# Patient Record
Sex: FEMALE | Race: BLACK OR AFRICAN AMERICAN | NOT HISPANIC OR LATINO | Employment: FULL TIME | URBAN - METROPOLITAN AREA
[De-identification: names, ages, dates, MRNs, and addresses within clinical notes are randomized per-mention and may not be internally consistent; named-entity substitution may affect disease eponyms.]

---

## 2022-05-17 ENCOUNTER — OFFICE VISIT (OUTPATIENT)
Dept: OTOLARYNGOLOGY | Facility: CLINIC | Age: 57
End: 2022-05-17
Payer: COMMERCIAL

## 2022-05-17 ENCOUNTER — ANESTHESIA EVENT (OUTPATIENT)
Dept: SURGERY | Facility: HOSPITAL | Age: 57
End: 2022-05-17
Payer: COMMERCIAL

## 2022-05-17 ENCOUNTER — HOSPITAL ENCOUNTER (OUTPATIENT)
Dept: CARDIOLOGY | Facility: HOSPITAL | Age: 57
Discharge: HOME | End: 2022-05-17
Payer: COMMERCIAL

## 2022-05-17 VITALS
SYSTOLIC BLOOD PRESSURE: 103 MMHG | HEART RATE: 79 BPM | TEMPERATURE: 98 F | WEIGHT: 233.94 LBS | DIASTOLIC BLOOD PRESSURE: 63 MMHG

## 2022-05-17 DIAGNOSIS — S00.83XA TRAUMATIC HEMATOMA OF FOREHEAD, INITIAL ENCOUNTER: ICD-10-CM

## 2022-05-17 DIAGNOSIS — Z01.818 OTHER SPECIFIED PRE-OPERATIVE EXAMINATION: Primary | ICD-10-CM

## 2022-05-17 DIAGNOSIS — S01.81XA FOREHEAD LACERATION, INITIAL ENCOUNTER: ICD-10-CM

## 2022-05-17 DIAGNOSIS — J34.89 NASAL OBSTRUCTION: ICD-10-CM

## 2022-05-17 DIAGNOSIS — S02.2XXA CLOSED DISPLACED FRACTURE OF NASAL BONE, INITIAL ENCOUNTER: ICD-10-CM

## 2022-05-17 DIAGNOSIS — S02.2XXA CLOSED DISPLACED FRACTURE OF NASAL BONE, INITIAL ENCOUNTER: Primary | ICD-10-CM

## 2022-05-17 PROCEDURE — 99214 OFFICE O/P EST MOD 30 MIN: CPT | Mod: PBBFAC

## 2022-05-17 PROCEDURE — 93005 ELECTROCARDIOGRAM TRACING: CPT

## 2022-05-17 RX ORDER — SODIUM CHLORIDE 0.9 % (FLUSH) 0.9 %
10 SYRINGE (ML) INJECTION
Status: DISCONTINUED | OUTPATIENT
Start: 2022-05-17 | End: 2022-05-18 | Stop reason: CLARIF

## 2022-05-17 RX ORDER — ACETAMINOPHEN 500 MG
TABLET ORAL
COMMUNITY
Start: 2022-02-15

## 2022-05-17 RX ORDER — HYDROCODONE BITARTRATE AND ACETAMINOPHEN 7.5; 325 MG/1; MG/1
1 TABLET ORAL EVERY 6 HOURS PRN
Status: ON HOLD | COMMUNITY
Start: 2022-05-12 | End: 2022-05-20 | Stop reason: HOSPADM

## 2022-05-17 RX ORDER — METFORMIN HYDROCHLORIDE 500 MG/1
500 TABLET, EXTENDED RELEASE ORAL 2 TIMES DAILY
COMMUNITY
Start: 2022-02-07

## 2022-05-17 RX ORDER — ATORVASTATIN CALCIUM 40 MG/1
TABLET, FILM COATED ORAL
COMMUNITY
Start: 2022-04-01

## 2022-05-17 RX ORDER — SPIRONOLACTONE 50 MG/1
50 TABLET, FILM COATED ORAL 2 TIMES DAILY
COMMUNITY
Start: 2022-02-08

## 2022-05-17 RX ORDER — LIDOCAINE HYDROCHLORIDE 10 MG/ML
1 INJECTION, SOLUTION EPIDURAL; INFILTRATION; INTRACAUDAL; PERINEURAL ONCE
Status: CANCELLED | OUTPATIENT
Start: 2022-05-17 | End: 2022-05-17

## 2022-05-17 RX ORDER — AMLODIPINE AND VALSARTAN 5; 320 MG/1; MG/1
1 TABLET ORAL DAILY
COMMUNITY
Start: 2022-02-24

## 2022-05-17 RX ORDER — HYDROCHLOROTHIAZIDE 25 MG/1
25 TABLET ORAL DAILY
COMMUNITY
Start: 2022-02-24

## 2022-05-17 NOTE — ANESTHESIA PREPROCEDURE EVALUATION
"                                                                                                             05/17/2022  Angeli Ash is a 56 y.o., female with PMHx of obesity, HTN, HLD, DM presents for CRNF.    COVID vaccine x 2 (last dose 3/18/22)    BETA-BLOCKER: NONE    [facility] NURSE PHONE INTERVIEW 5/18/22    PROBLEM LIST:  -  CLOSED NASAL FRACTURE, NASAL OBSTRUCTION (ALSO w/FOREHEAD      LAC/HEMATOMA) 2/2 FALL 5/12/22  -  OBESITY (NEED HT. For BMI)  -  HTN  -  HLD  -  MIGRAINES  -  T2DM (ORAL X1); NO HOME GLUCOSE TESTING  -  ANEMIA - 12/27/14 H&H 6/26    AM Rx DOS: EXFORGE, NORCO PRN    ORDERS -   SURGEON: 12/27/14 CBC, CMP; 5/17/22 EKG; NO NEW ORDER  ANESTHESIA: ADD: CBC, BMP, A1c; DM PROTOCOL    Pre-op Assessment    I have reviewed the NPO Status.      Review of Systems  Anesthesia Hx:  No problems with previous Anesthesia    Social:  Non-Smoker    Cardiovascular:   Hypertension, well controlled hyperlipidemia    Pulmonary:  Pulmonary Normal    Renal/:  Renal/ Normal     Hepatic/GI:  Hepatic/GI Normal    Neurological:  Neurology Normal    Endocrine:   Diabetes  Obesity / BMI > 30    Vitals:    05/18/22 1029 05/20/22 0718   BP:  127/82   Pulse:  86   Temp:  36.8 °C (98.3 °F)   TempSrc:  Oral   SpO2:  98%   Weight: 106.1 kg (234 lb)    Height: 5' 5" (1.651 m)        Physical Exam  General: Alert, Cooperative and Oriented    Airway:  Mallampati: II   Mouth Opening: Normal  TM Distance: Normal  Tongue: Normal  Neck ROM: Normal ROM    Dental:  Intact    Chest/Lungs:  Clear to auscultation, Normal Respiratory Rate    Heart:  Rate: Normal  Rhythm: Regular Rhythm  Sounds: Normal       Latest Reference Range & Units 05/20/22 09:50   POCT Glucose 70 - 110 mg/dL 98     Lab Results   Component Value Date    WBC 7.5 05/18/2022    HGB 12.0 05/18/2022    HCT 38.6 05/18/2022    MCV 82.3 05/18/2022     05/18/2022       CMP  Sodium Level   Date Value Ref Range Status   05/18/2022 138 136 - 145 mmol/L Final "     Potassium Level   Date Value Ref Range Status   05/18/2022 4.7 3.5 - 5.1 mmol/L Final     Carbon Dioxide   Date Value Ref Range Status   05/18/2022 23 22 - 29 mmol/L Final     Blood Urea Nitrogen   Date Value Ref Range Status   05/18/2022 20.5 (H) 9.8 - 20.1 mg/dL Final     Creatinine   Date Value Ref Range Status   05/18/2022 1.14 (H) 0.55 - 1.02 mg/dL Final     Calcium Level Total   Date Value Ref Range Status   05/18/2022 10.0 8.4 - 10.2 mg/dL Final       5/17/22 EKG          Anesthesia Plan  Type of Anesthesia, risks & benefits discussed:    Anesthesia Type: Gen Supraglottic Airway  Intra-op Monitoring Plan: Standard ASA Monitors  Post Op Pain Control Plan: IV/PO Opioids PRN  Induction:  IV  Informed Consent: Informed consent signed with the Patient and all parties understand the risks and agree with anesthesia plan.  All questions answered.   ASA Score: 2  Day of Surgery Review of History & Physical: H&P Update referred to the surgeon/provider.    Ready For Surgery From Anesthesia Perspective.     .

## 2022-05-17 NOTE — PROGRESS NOTES
[facility]  [address] [address]  Dept: [phone]    05/17/2022  .hh      <Will be replaced with encounter date>

## 2022-05-17 NOTE — PROGRESS NOTES
[facility]      REFERRING PHYSICIAN: ED  REASON FOR REFERRAL:  Facial trauma  CHIEF COMPLAINT:   Chief Complaint   Patient presents with    Had a fall and hit concrete,     Went to [facility], referred to [facility]      HISTORY OF PRESENT ILLNESS:   Angeli Ash is a 56 y.o. female with a past medical history significant for hypertension presenting for evaluation of facial trauma.  Patient had a fall while she was putting out her trash can on 05/12/2022.  She did not have any loss of consciousness, syncope or lightheadedness at this time.  Patient was then seen at [facility] at which time she was noted to have a forehead laceration and some nasal bone fractures.  Patient was referred here for further care.  At this time her laceration has been sutured however, she has not been applying any ointment to wait.  There is some crusting around it and usually she has some pain during cleaning her sutures and therefore only performing limited wound care.  She has been prescribed Norco however this makes her nauseous.  She does note she has some nasal obstruction for which she is using nasal saline after which she does have improvement in her symptoms.  Denies all other head and neck related issues    REVIEW OF SYSTEMS:   ROS   As above and otherwise negative X 10-point review.     PAST MEDICAL HISTORY:  Past Medical History:   Diagnosis Date    Facial trauma May 12, 2022    Hypertension 2015    Migraine headache 1976    Obesity 2016        PAST SURGICAL HISTORY:  History reviewed. No pertinent surgical history.      SOCIAL HISTORY:  Social History     Socioeconomic History    Marital status: Single   Tobacco Use    Smoking status: Never Smoker    Smokeless tobacco: Never Used    Tobacco comment: Family members on both sides smoked.   Substance and Sexual Activity    Alcohol use: Never    Drug use: Never    Sexual activity: Not Currently      Partners: Male     Comment: The Pill         ALLERGIES:  Review of patient's allergies indicates:  No Known Allergies     FAMILY HISTORY:  Family History   Problem Relation Age of Onset    Hypertension Mother     Stroke Maternal Grandmother     Cancer Maternal Aunt     Diabetes Brother     Heart failure Sister         CURRENT MEDICATIONS:  Current Outpatient Medications on File Prior to Visit   Medication Sig Dispense Refill    atorvastatin (LIPITOR) 40 MG tablet       BABY ASPIRIN ORAL       cholecalciferol, vitamin D3, (VITAMIN D3) 50 mcg (2,000 unit) Cap capsule       amlodipine-valsartan (EXFORGE) 5-320 mg per tablet Take 1 tablet by mouth once daily.      hydroCHLOROthiazide (HYDRODIURIL) 25 MG tablet Take 25 mg by mouth once daily.      HYDROcodone-acetaminophen (NORCO) 7.5-325 mg per tablet Take 1 tablet by mouth every 6 (six) hours as needed.      metFORMIN (GLUCOPHAGE-XR) 500 MG ER 24hr tablet Take 500 mg by mouth 2 (two) times daily.      spironolactone (ALDACTONE) 50 MG tablet Take 50 mg by mouth 2 (two) times daily.       No current facility-administered medications on file prior to visit.        PHYSICAL EXAMINATION:  Vitals:    05/17/22 0922   BP: 103/63   Pulse: 79   Temp: 97.8 °F (36.6 °C)        General/ Voice: NAD, AAO, no dyspnea/inc WOB, no stridor, tolerates secretions; communicates effectively via voicing which is normal   Neuro: CN II - XII exam: intact    Head/Face:  Large T-shaped laceration paramedian forehead that appears to be well reapproximated and sutured with Prolene.  There does appear to be an underlying hematoma on exam.  This is soft without any erythema or tenderness to palpation.  Mild crepitus  Eyes: EOMI, PERRLA   Ears: Hearing well at normal conversation volume  AD:  Auricle within normal limit, external auditory canal also within normal limit, TM appears normal without any middle ear effusion, nonobstructive cerumen seen  AS: Auricle within normal limit,  external auditory canal also within normal limit, TM appears normal without any middle ear effusion, nonobstructive cerumen seen  Nose: nares normal, left-sided septal deviation, right-sided nasal bone bony step-off with a visible depression  Oral Cavity: MMM, no lesions or ulcerations, no leukoplakia, no edema; BOT and FOM are soft/NT and without lesions/ ulcerations/ leukoplakia; dentitions is fair   Oropharynx: No uvular deviation or deviation of the oropharyngeal wall noted, no erythema/ petechia/ clots; No effacement of the palatopharyngeal and/or palatoglossal folds. No fluctuance; tonsils are symmetric and 1+ without erythema/ exudate   Neck: no asymmetry, masses, or scars, no adenopathy, trachea midline, palpable landmarks; thyroid is soft, mobile, normal in size without nodules or tenderness  Cardiovascular: RRR, no r/g/m, 2+ distal pulses,    Respiratory:  chest rise symmetric bilaterally, normal effort and expanion      LABORATORY RESULTS:    RADIOLOGY (Personally reviewed by me):   CT Scan:          ASSESSMENT/PLAN:    Angeli Ash is a 56 y.o. female with forehead laceration (sutured), forehead hematoma, and nasal bone fractures with displacement and a step-off on exam resulting in nasal obstruction.    --given obvious nasal deformity along with nasal obstruction, we recommended closed nasal bone reduction to patient today and she is agreeable.  She would like to proceed this Friday on 05/20/2022.  We discussed risks, benefits and indications of nasal bone reduction.  Will plan to remove his sutures same day  --dDiscussed wound care.  Discussed with patient including laceration with soap and warm water.  She is to apply ointment (aquaphor) twice a day and keep a small layer of ointment on at all times.  To remove crusting use dilute hydrogen  -peroxide on a Q-tip.  -- In regards to her pain medication we discussed taking Tylenol as needed since her pain is mild at this time.  Given she has left our  more Norco, will not be providing narcotic pain medications postoperatively  -- Alternate warm/ ice compress to forhead  -- EKG to be performed today      A total of 40 minutes were spent in this encounter.    RTC 1 week post-op  Wandy Vidales MD   [facility] Department of Otolaryngology  PGY 5

## 2022-05-17 NOTE — PROGRESS NOTES
I have reviewed the notes, assessments, and/or procedures performed this visit, and I concur with the documentation.    Damion Ferguson M.D.

## 2022-05-17 NOTE — H&P (VIEW-ONLY)
[facility]      REFERRING PHYSICIAN: ED  REASON FOR REFERRAL:  Facial trauma  CHIEF COMPLAINT:   Chief Complaint   Patient presents with    Had a fall and hit concrete,     Went to [facility], referred to [facility]NT      HISTORY OF PRESENT ILLNESS:   Angeli Ash is a 56 y.o. female with a past medical history significant for hypertension presenting for evaluation of facial trauma.  Patient had a fall while she was putting out her trash can on 05/12/2022.  She did not have any loss of consciousness, syncope or lightheadedness at this time.  Patient was then seen at [facility] at which time she was noted to have a forehead laceration and some nasal bone fractures.  Patient was referred here for further care.  At this time her laceration has been sutured however, she has not been applying any ointment to wait.  There is some crusting around it and usually she has some pain during cleaning her sutures and therefore only performing limited wound care.  She has been prescribed Norco however this makes her nauseous.  She does note she has some nasal obstruction for which she is using nasal saline after which she does have improvement in her symptoms.  Denies all other head and neck related issues    REVIEW OF SYSTEMS:   ROS   As above and otherwise negative X 10-point review.     PAST MEDICAL HISTORY:  Past Medical History:   Diagnosis Date    Facial trauma May 12, 2022    Hypertension 2015    Migraine headache 1976    Obesity 2016        PAST SURGICAL HISTORY:  History reviewed. No pertinent surgical history.      SOCIAL HISTORY:  Social History     Socioeconomic History    Marital status: Single   Tobacco Use    Smoking status: Never Smoker    Smokeless tobacco: Never Used    Tobacco comment: Family members on both sides smoked.   Substance and Sexual Activity    Alcohol use: Never    Drug use: Never    Sexual activity: Not Currently      Partners: Male     Comment: The Pill         ALLERGIES:  Review of patient's allergies indicates:  No Known Allergies     FAMILY HISTORY:  Family History   Problem Relation Age of Onset    Hypertension Mother     Stroke Maternal Grandmother     Cancer Maternal Aunt     Diabetes Brother     Heart failure Sister         CURRENT MEDICATIONS:  Current Outpatient Medications on File Prior to Visit   Medication Sig Dispense Refill    atorvastatin (LIPITOR) 40 MG tablet       BABY ASPIRIN ORAL       cholecalciferol, vitamin D3, (VITAMIN D3) 50 mcg (2,000 unit) Cap capsule       amlodipine-valsartan (EXFORGE) 5-320 mg per tablet Take 1 tablet by mouth once daily.      hydroCHLOROthiazide (HYDRODIURIL) 25 MG tablet Take 25 mg by mouth once daily.      HYDROcodone-acetaminophen (NORCO) 7.5-325 mg per tablet Take 1 tablet by mouth every 6 (six) hours as needed.      metFORMIN (GLUCOPHAGE-XR) 500 MG ER 24hr tablet Take 500 mg by mouth 2 (two) times daily.      spironolactone (ALDACTONE) 50 MG tablet Take 50 mg by mouth 2 (two) times daily.       No current facility-administered medications on file prior to visit.        PHYSICAL EXAMINATION:  Vitals:    05/17/22 0922   BP: 103/63   Pulse: 79   Temp: 97.8 °F (36.6 °C)        General/ Voice: NAD, AAO, no dyspnea/inc WOB, no stridor, tolerates secretions; communicates effectively via voicing which is normal   Neuro: CN II - XII exam: intact    Head/Face:  Large T-shaped laceration paramedian forehead that appears to be well reapproximated and sutured with Prolene.  There does appear to be an underlying hematoma on exam.  This is soft without any erythema or tenderness to palpation.  Mild crepitus  Eyes: EOMI, PERRLA   Ears: Hearing well at normal conversation volume  AD:  Auricle within normal limit, external auditory canal also within normal limit, TM appears normal without any middle ear effusion, nonobstructive cerumen seen  AS: Auricle within normal limit,  external auditory canal also within normal limit, TM appears normal without any middle ear effusion, nonobstructive cerumen seen  Nose: nares normal, left-sided septal deviation, right-sided nasal bone bony step-off with a visible depression  Oral Cavity: MMM, no lesions or ulcerations, no leukoplakia, no edema; BOT and FOM are soft/NT and without lesions/ ulcerations/ leukoplakia; dentitions is fair   Oropharynx: No uvular deviation or deviation of the oropharyngeal wall noted, no erythema/ petechia/ clots; No effacement of the palatopharyngeal and/or palatoglossal folds. No fluctuance; tonsils are symmetric and 1+ without erythema/ exudate   Neck: no asymmetry, masses, or scars, no adenopathy, trachea midline, palpable landmarks; thyroid is soft, mobile, normal in size without nodules or tenderness  Cardiovascular: RRR, no r/g/m, 2+ distal pulses,    Respiratory:  chest rise symmetric bilaterally, normal effort and expanion      LABORATORY RESULTS:    RADIOLOGY (Personally reviewed by me):   CT Scan:          ASSESSMENT/PLAN:    Angeli Ash is a 56 y.o. female with forehead laceration (sutured), forehead hematoma, and nasal bone fractures with displacement and a step-off on exam resulting in nasal obstruction.    --given obvious nasal deformity along with nasal obstruction, we recommended closed nasal bone reduction to patient today and she is agreeable.  She would like to proceed this Friday on 05/20/2022.  We discussed risks, benefits and indications of nasal bone reduction.  Will plan to remove his sutures same day  --dDiscussed wound care.  Discussed with patient including laceration with soap and warm water.  She is to apply ointment (aquaphor) twice a day and keep a small layer of ointment on at all times.  To remove crusting use dilute hydrogen  -peroxide on a Q-tip.  -- In regards to her pain medication we discussed taking Tylenol as needed since her pain is mild at this time.  Given she has left our  more Norco, will not be providing narcotic pain medications postoperatively  -- Alternate warm/ ice compress to forhead  -- EKG to be performed today      A total of 40 minutes were spent in this encounter.    RTC 1 week post-op  Wandy Vidales MD   [facility] Department of Otolaryngology  PGY 5

## 2022-05-18 LAB
ANION GAP SERPL CALC-SCNC: 12 MEQ/L
BASOPHILS # BLD AUTO: 0.03 X10(3)/MCL (ref 0–0.2)
BASOPHILS NFR BLD AUTO: 0.4 %
BUN SERPL-MCNC: 20.5 MG/DL (ref 9.8–20.1)
CALCIUM SERPL-MCNC: 10 MG/DL (ref 8.4–10.2)
CHLORIDE SERPL-SCNC: 103 MMOL/L (ref 98–107)
CO2 SERPL-SCNC: 23 MMOL/L (ref 22–29)
CREAT SERPL-MCNC: 1.14 MG/DL (ref 0.55–1.02)
CREAT/UREA NIT SERPL: 18
EOSINOPHIL # BLD AUTO: 0.12 X10(3)/MCL (ref 0–0.9)
EOSINOPHIL NFR BLD AUTO: 1.6 %
ERYTHROCYTE [DISTWIDTH] IN BLOOD BY AUTOMATED COUNT: 13.6 % (ref 11.5–17)
EST. AVERAGE GLUCOSE BLD GHB EST-MCNC: 111.2 MG/DL
GLUCOSE SERPL-MCNC: 76 MG/DL (ref 74–100)
HBA1C MFR BLD: 5.5 %
HCT VFR BLD AUTO: 38.6 % (ref 37–47)
HGB BLD-MCNC: 12 GM/DL (ref 12–16)
IMM GRANULOCYTES # BLD AUTO: 0.03 X10(3)/MCL (ref 0–0.02)
IMM GRANULOCYTES NFR BLD AUTO: 0.4 % (ref 0–0.43)
LYMPHOCYTES # BLD AUTO: 1.71 X10(3)/MCL (ref 0.6–4.6)
LYMPHOCYTES NFR BLD AUTO: 22.8 %
MCH RBC QN AUTO: 25.6 PG (ref 27–31)
MCHC RBC AUTO-ENTMCNC: 31.1 MG/DL (ref 33–36)
MCV RBC AUTO: 82.3 FL (ref 80–94)
MONOCYTES # BLD AUTO: 0.51 X10(3)/MCL (ref 0.1–1.3)
MONOCYTES NFR BLD AUTO: 6.8 %
NEUTROPHILS # BLD AUTO: 5.1 X10(3)/MCL (ref 2.1–9.2)
NEUTROPHILS NFR BLD AUTO: 68 %
NRBC BLD AUTO-RTO: 0 %
PLATELET # BLD AUTO: 261 X10(3)/MCL (ref 130–400)
PMV BLD AUTO: 12.1 FL (ref 9.4–12.4)
POTASSIUM SERPL-SCNC: 4.7 MMOL/L (ref 3.5–5.1)
RBC # BLD AUTO: 4.69 X10(6)/MCL (ref 4.2–5.4)
SODIUM SERPL-SCNC: 138 MMOL/L (ref 136–145)
WBC # SPEC AUTO: 7.5 X10(3)/MCL (ref 4.5–11.5)

## 2022-05-18 PROCEDURE — 85025 COMPLETE CBC W/AUTO DIFF WBC: CPT

## 2022-05-18 PROCEDURE — 36415 COLL VENOUS BLD VENIPUNCTURE: CPT

## 2022-05-18 PROCEDURE — 83036 HEMOGLOBIN GLYCOSYLATED A1C: CPT

## 2022-05-18 PROCEDURE — 80048 BASIC METABOLIC PNL TOTAL CA: CPT

## 2022-05-20 ENCOUNTER — HOSPITAL ENCOUNTER (OUTPATIENT)
Facility: HOSPITAL | Age: 57
Discharge: HOME | End: 2022-05-20
Attending: OTOLARYNGOLOGY | Admitting: OTOLARYNGOLOGY
Payer: COMMERCIAL

## 2022-05-20 ENCOUNTER — ANESTHESIA (OUTPATIENT)
Dept: SURGERY | Facility: HOSPITAL | Age: 57
End: 2022-05-20
Payer: COMMERCIAL

## 2022-05-20 DIAGNOSIS — Z01.818 OTHER SPECIFIED PRE-OPERATIVE EXAMINATION: ICD-10-CM

## 2022-05-20 DIAGNOSIS — S01.81XA FOREHEAD LACERATION, INITIAL ENCOUNTER: ICD-10-CM

## 2022-05-20 DIAGNOSIS — S02.2XXA CLOSED DISPLACED FRACTURE OF NASAL BONE, INITIAL ENCOUNTER: Primary | ICD-10-CM

## 2022-05-20 DIAGNOSIS — J34.89 NASAL OBSTRUCTION: ICD-10-CM

## 2022-05-20 DIAGNOSIS — S00.83XA TRAUMATIC HEMATOMA OF FOREHEAD, INITIAL ENCOUNTER: ICD-10-CM

## 2022-05-20 LAB
POCT GLUCOSE: 91 MG/DL (ref 70–110)
POCT GLUCOSE: 98 MG/DL (ref 70–110)

## 2022-05-20 PROCEDURE — 63600175 PHARM REV CODE 636 W HCPCS: Performed by: NURSE ANESTHETIST, CERTIFIED REGISTERED

## 2022-05-20 PROCEDURE — 37000008 HC ANESTHESIA 1ST 15 MINUTES: Performed by: OTOLARYNGOLOGY

## 2022-05-20 PROCEDURE — 63600175 PHARM REV CODE 636 W HCPCS

## 2022-05-20 PROCEDURE — 27201423 OPTIME MED/SURG SUP & DEVICES STERILE SUPPLY: Performed by: OTOLARYNGOLOGY

## 2022-05-20 PROCEDURE — 36000704 HC OR TIME LEV I 1ST 15 MIN: Performed by: OTOLARYNGOLOGY

## 2022-05-20 PROCEDURE — 25000003 PHARM REV CODE 250: Performed by: STUDENT IN AN ORGANIZED HEALTH CARE EDUCATION/TRAINING PROGRAM

## 2022-05-20 PROCEDURE — 71000033 HC RECOVERY, INTIAL HOUR: Performed by: OTOLARYNGOLOGY

## 2022-05-20 PROCEDURE — 37000009 HC ANESTHESIA EA ADD 15 MINS: Performed by: OTOLARYNGOLOGY

## 2022-05-20 PROCEDURE — 25000003 PHARM REV CODE 250

## 2022-05-20 PROCEDURE — 71000016 HC POSTOP RECOV ADDL HR: Performed by: OTOLARYNGOLOGY

## 2022-05-20 PROCEDURE — 36000705 HC OR TIME LEV I EA ADD 15 MIN: Performed by: OTOLARYNGOLOGY

## 2022-05-20 PROCEDURE — 71000015 HC POSTOP RECOV 1ST HR: Performed by: OTOLARYNGOLOGY

## 2022-05-20 PROCEDURE — 63600175 PHARM REV CODE 636 W HCPCS: Performed by: ANESTHESIOLOGY

## 2022-05-20 PROCEDURE — 25000003 PHARM REV CODE 250: Performed by: NURSE ANESTHETIST, CERTIFIED REGISTERED

## 2022-05-20 RX ORDER — LIDOCAINE HCL/PF 100 MG/5ML
SYRINGE (ML) INTRAVENOUS
Status: DISCONTINUED | OUTPATIENT
Start: 2022-05-20 | End: 2022-05-20

## 2022-05-20 RX ORDER — PROMETHAZINE HYDROCHLORIDE 25 MG/ML
INJECTION, SOLUTION INTRAMUSCULAR; INTRAVENOUS
Status: COMPLETED
Start: 2022-05-20 | End: 2022-05-20

## 2022-05-20 RX ORDER — OXYMETAZOLINE HCL 0.05 %
SPRAY, NON-AEROSOL (ML) NASAL
Status: DISCONTINUED
Start: 2022-05-20 | End: 2022-05-20 | Stop reason: HOSPADM

## 2022-05-20 RX ORDER — OXYMETAZOLINE HCL 0.05 %
SPRAY, NON-AEROSOL (ML) NASAL
Status: DISCONTINUED | OUTPATIENT
Start: 2022-05-20 | End: 2022-05-20 | Stop reason: HOSPADM

## 2022-05-20 RX ORDER — TRAMADOL HYDROCHLORIDE 50 MG/1
50 TABLET ORAL EVERY 8 HOURS PRN
Qty: 12 TABLET | Refills: 0 | Status: SHIPPED | OUTPATIENT
Start: 2022-05-20

## 2022-05-20 RX ORDER — PROPOFOL 10 MG/ML
INJECTION, EMULSION INTRAVENOUS
Status: DISCONTINUED | OUTPATIENT
Start: 2022-05-20 | End: 2022-05-20

## 2022-05-20 RX ORDER — BACITRACIN 500 [USP'U]/G
OINTMENT TOPICAL
Status: DISCONTINUED
Start: 2022-05-20 | End: 2022-05-20 | Stop reason: HOSPADM

## 2022-05-20 RX ORDER — HYDROMORPHONE HYDROCHLORIDE 1 MG/ML
INJECTION, SOLUTION INTRAMUSCULAR; INTRAVENOUS; SUBCUTANEOUS
Status: DISCONTINUED | OUTPATIENT
Start: 2022-05-20 | End: 2022-05-20

## 2022-05-20 RX ORDER — ONDANSETRON 2 MG/ML
INJECTION INTRAMUSCULAR; INTRAVENOUS
Status: DISCONTINUED | OUTPATIENT
Start: 2022-05-20 | End: 2022-05-20

## 2022-05-20 RX ORDER — CEFAZOLIN SODIUM 1 G/3ML
2 INJECTION, POWDER, FOR SOLUTION INTRAMUSCULAR; INTRAVENOUS ONCE
Status: DISCONTINUED | OUTPATIENT
Start: 2022-05-20 | End: 2022-05-20 | Stop reason: HOSPADM

## 2022-05-20 RX ORDER — BACITRACIN ZINC 500 UNIT/G
OINTMENT (GRAM) TOPICAL
Status: DISCONTINUED | OUTPATIENT
Start: 2022-05-20 | End: 2022-05-20 | Stop reason: HOSPADM

## 2022-05-20 RX ORDER — PROMETHAZINE HYDROCHLORIDE 25 MG/ML
12.5 INJECTION, SOLUTION INTRAMUSCULAR; INTRAVENOUS ONCE
Status: CANCELLED | OUTPATIENT
Start: 2022-05-20

## 2022-05-20 RX ORDER — MORPHINE SULFATE 10 MG/ML
2 INJECTION INTRAMUSCULAR; INTRAVENOUS; SUBCUTANEOUS EVERY 5 MIN PRN
Status: COMPLETED | OUTPATIENT
Start: 2022-05-20 | End: 2022-05-20

## 2022-05-20 RX ORDER — LIDOCAINE HYDROCHLORIDE 10 MG/ML
1 INJECTION, SOLUTION EPIDURAL; INFILTRATION; INTRACAUDAL; PERINEURAL ONCE
Status: ACTIVE | OUTPATIENT
Start: 2022-05-20

## 2022-05-20 RX ORDER — OXYCODONE HYDROCHLORIDE 5 MG/1
5 TABLET ORAL
Status: DISCONTINUED | OUTPATIENT
Start: 2022-05-20 | End: 2022-05-20 | Stop reason: HOSPADM

## 2022-05-20 RX ORDER — ONDANSETRON 2 MG/ML
4 INJECTION INTRAMUSCULAR; INTRAVENOUS DAILY PRN
Status: DISCONTINUED | OUTPATIENT
Start: 2022-05-20 | End: 2022-05-20 | Stop reason: HOSPADM

## 2022-05-20 RX ORDER — CEFAZOLIN SODIUM 1 G/3ML
INJECTION, POWDER, FOR SOLUTION INTRAMUSCULAR; INTRAVENOUS
Status: DISCONTINUED | OUTPATIENT
Start: 2022-05-20 | End: 2022-05-20

## 2022-05-20 RX ORDER — FENTANYL CITRATE 50 UG/ML
INJECTION, SOLUTION INTRAMUSCULAR; INTRAVENOUS
Status: DISCONTINUED | OUTPATIENT
Start: 2022-05-20 | End: 2022-05-20

## 2022-05-20 RX ORDER — LIDOCAINE HYDROCHLORIDE 10 MG/ML
1 INJECTION, SOLUTION EPIDURAL; INFILTRATION; INTRACAUDAL; PERINEURAL ONCE
Status: DISCONTINUED | OUTPATIENT
Start: 2022-05-20 | End: 2022-05-20 | Stop reason: HOSPADM

## 2022-05-20 RX ORDER — SODIUM CHLORIDE, SODIUM LACTATE, POTASSIUM CHLORIDE, CALCIUM CHLORIDE 600; 310; 30; 20 MG/100ML; MG/100ML; MG/100ML; MG/100ML
INJECTION, SOLUTION INTRAVENOUS CONTINUOUS
Status: DISCONTINUED | OUTPATIENT
Start: 2022-05-20 | End: 2022-05-20 | Stop reason: HOSPADM

## 2022-05-20 RX ORDER — MEPERIDINE HYDROCHLORIDE 25 MG/ML
12.5 INJECTION INTRAMUSCULAR; INTRAVENOUS; SUBCUTANEOUS EVERY 10 MIN PRN
Status: DISCONTINUED | OUTPATIENT
Start: 2022-05-20 | End: 2022-05-20 | Stop reason: HOSPADM

## 2022-05-20 RX ORDER — MIDAZOLAM HYDROCHLORIDE 1 MG/ML
INJECTION INTRAMUSCULAR; INTRAVENOUS
Status: DISCONTINUED
Start: 2022-05-20 | End: 2022-05-20 | Stop reason: HOSPADM

## 2022-05-20 RX ORDER — LIDOCAINE HCL/EPINEPHRINE/PF 2%-1:200K
VIAL (ML) INJECTION
Status: DISCONTINUED
Start: 2022-05-20 | End: 2022-05-20 | Stop reason: WASHOUT

## 2022-05-20 RX ORDER — INSULIN ASPART 100 [IU]/ML
4-12 INJECTION, SOLUTION INTRAVENOUS; SUBCUTANEOUS
Status: ACTIVE | OUTPATIENT
Start: 2022-05-20

## 2022-05-20 RX ORDER — MIDAZOLAM HYDROCHLORIDE 1 MG/ML
2 INJECTION INTRAMUSCULAR; INTRAVENOUS ONCE AS NEEDED
Status: COMPLETED | OUTPATIENT
Start: 2022-05-20 | End: 2022-05-20

## 2022-05-20 RX ORDER — INSULIN ASPART 100 [IU]/ML
2-9 INJECTION, SOLUTION INTRAVENOUS; SUBCUTANEOUS
Status: ACTIVE | OUTPATIENT
Start: 2022-05-20

## 2022-05-20 RX ORDER — ACETAMINOPHEN 500 MG
1000 TABLET ORAL EVERY 8 HOURS PRN
Qty: 20 TABLET | Refills: 0 | Status: SHIPPED | OUTPATIENT
Start: 2022-05-20 | End: 2022-05-27

## 2022-05-20 RX ADMIN — ONDANSETRON 4 MG: 2 INJECTION INTRAMUSCULAR; INTRAVENOUS at 02:05

## 2022-05-20 RX ADMIN — MORPHINE SULFATE 2 MG: 10 INJECTION, SOLUTION INTRAMUSCULAR; INTRAVENOUS at 01:05

## 2022-05-20 RX ADMIN — MIDAZOLAM 2 MG: 1 INJECTION INTRAMUSCULAR; INTRAVENOUS at 11:05

## 2022-05-20 RX ADMIN — MORPHINE SULFATE 2 MG: 10 INJECTION, SOLUTION INTRAMUSCULAR; INTRAVENOUS at 02:05

## 2022-05-20 RX ADMIN — CEFAZOLIN 2 G: 330 INJECTION, POWDER, FOR SOLUTION INTRAMUSCULAR; INTRAVENOUS at 12:05

## 2022-05-20 RX ADMIN — SODIUM CHLORIDE, POTASSIUM CHLORIDE, SODIUM LACTATE AND CALCIUM CHLORIDE: 600; 310; 30; 20 INJECTION, SOLUTION INTRAVENOUS at 11:05

## 2022-05-20 RX ADMIN — LIDOCAINE HYDROCHLORIDE 80 MG: 20 INJECTION, SOLUTION INTRAVENOUS at 12:05

## 2022-05-20 RX ADMIN — FENTANYL CITRATE 50 MCG: 50 INJECTION INTRAMUSCULAR; INTRAVENOUS at 12:05

## 2022-05-20 RX ADMIN — HYDROMORPHONE HYDROCHLORIDE 0.5 MG: 1 INJECTION, SOLUTION INTRAMUSCULAR; INTRAVENOUS; SUBCUTANEOUS at 01:05

## 2022-05-20 RX ADMIN — PROMETHAZINE HYDROCHLORIDE 12.5 MG: 25 INJECTION INTRAMUSCULAR; INTRAVENOUS at 02:05

## 2022-05-20 RX ADMIN — PROPOFOL 150 MG: 10 INJECTION, EMULSION INTRAVENOUS at 12:05

## 2022-05-20 RX ADMIN — ONDANSETRON 4 MG: 2 INJECTION INTRAMUSCULAR; INTRAVENOUS at 12:05

## 2022-05-20 NOTE — ANESTHESIA PROCEDURE NOTES
Intubation    Date/Time: 5/20/2022 12:06 PM  Performed by: Yen Pérez CRNA  Authorized by: Sissy Simmons MD     Intubation:     Induction:  Intravenous    Intubated:  Postinduction    Mask Ventilation:  Easy mask    Attempts:  1    Attempted By:  CRNA    Difficult Airway Encountered?: No      Complications:  None    Airway Device:  Supraglottic airway/LMA    Airway Device Size:  4.0    Style/Cuff Inflation:  Uncuffed    Placement Verified By:  Capnometry    Complicating Factors:  None    Findings Post-Intubation:  BS equal bilateral and atraumatic/condition of teeth unchanged

## 2022-05-20 NOTE — ANESTHESIA POSTPROCEDURE EVALUATION
Anesthesia Post Evaluation    Patient: Angeli Ash    Procedure(s) Performed: Procedure(s) (LRB):  CLOSED REDUCTION, FRACTURE, NASAL BONE (N/A)  REMOVAL, SUTURE (N/A)    Final Anesthesia Type: general      Patient location during evaluation: PACU  Post-procedure vital signs: reviewed and stable  Airway patency: patent      Anesthetic complications: no      Cardiovascular status: hemodynamically stable  Respiratory status: spontaneous ventilation  Follow-up not needed.          Vitals Value Taken Time   /88 05/20/22 1404   Temp 36.4 °C (97.5 °F) 05/20/22 1349   Pulse 78 05/20/22 1404   Resp 18 05/20/22 1410   SpO2 96 % 05/20/22 1404         No case tracking events are documented in the log.      Pain/Lona Score: Pain Rating Prior to Med Admin: 6 (5/20/2022  2:10 PM)  Lona Score: 9 (5/20/2022  1:38 PM)

## 2022-05-20 NOTE — OP NOTE
[facility]  Otolaryngology  Operative Note    SUMMARY     Date of Procedure: 5/20/2022     Procedure: Procedure(s)   CLOSED REDUCTION, FRACTURE, NASAL BONE  CLOSED REDUCTION SEPTAL FRACTURE  SUTURE REMOVAL, WASHOUT AND REPAIR OF FOREHEAD LACERATION    Surgeon(s) and Role:     * Trev Rosa MD - Primary     * Wandy Vidales MD - Resident - Assisting     * Sarah Narayanan MD - Resident - Assisting      Pre-Operative Diagnosis: Closed displaced fracture of nasal bone, initial encounter [S02.2XXA]  Nasal obstruction [J34.89]  Forehead laceration, initial encounter [S01.81XA]  Traumatic hematoma of forehead, initial encounter [S00.83XA]    Post-Operative Diagnosis: Post-Op Diagnosis Codes:     * Closed displaced fracture of nasal bone, initial encounter [S02.2XXA]     * Nasal obstruction [J34.89]     * Forehead laceration, initial encounter [S01.81XA]     * Traumatic hematoma of forehead, initial encounter [S00.83XA]     * Hematoma of the forehead    Anesthesia: General    Estimated Blood Loss (EBL): 15 ml           Implants: External Denver split on nasal bones    Operative Findings (including complications, if any): Forehead hematoma, dehiscence of previous repair of laceration measuring 8 x 10 cm requiring washout and closure. Depressed R nasal bone fracture, left nasal bone fracture    Indication:  Angeli Ash is a 56 y.o. F who presented to the ENT clinic s/p fall with bilateral nasal bone fractures and septal fracture. She also had a forehead laceration that was repaired by the ED providers. Patient was seen and examined in the ENT clinic and her CT scan demonstrated bilateral nasal bone fractures with septal fracture. She reported associated nasal obstruction and had nasal deformity so patient was offered a closed reduction of her nasal bone fractures and septal fracture. She was also offered suture removal as her sutures had been in place for 8 days. After risks, benefits and  alternatives were discussed patient was scheduled for surgery.     Procedure in detail:  The patient was brought to the operating theater and placed supine on the operating table.  General endotracheal anesthesia was induced.  The forehead was prepped and draped in the usual sterile fashion.  Timeout was performed.  Perioperative antibiotics were administered. Forehead laceration sutures were removed. Palpable fluid collection was seen under the laceration, and hematoma was expressed. The wound then dehisced and pericranium was exposed. The wound was copiously irrigated with peroxide and betadine solution, followed by normal saline. Hemostasis was ensured using a combination of bipolar and monopolar cautery. The laceration was closed in layers using 3-0 vicryl deep for the subcutaneous layer encompassing muscle in simple interrupted fashion. The skin was then closed using 4-0 prolene in running locking fashion.     Attention was then turned to the nose. Afrin soaked pledgets were placed into the bilateral nasal cavities. De Jesus elevator was used to reduce the depressed R nasal bone first, followed by the L nasal bone. Asch forceps were used to straighten the deviated nasal septum. Nasopore was placed into the bilateral nasal cavities to support the nasal bones. Mastisol, steri strips, and Denver splint were then placed on the nasal dorsum.     Bacitracin was placed along the forehead incision, and pressure dressing was applied. The patient was then undraped, cleaned off, and turned over to anesthesia for emergence. All counts were correct at the end of the procedure.    Dr. Rosa was present and scrubbed for the entire procedure.      Specimens:   Specimen (24h ago, onward)            None          Condition: Good    Disposition: PACU - hemodynamically stable.

## 2022-05-20 NOTE — DISCHARGE INSTRUCTIONS
NO lifting of anything heavier than 10 pounds for two weeks.   Try your hardest not to blow your nose    if you have to sneeze, try to sneeze with your mouth open   KEEP PRESSURE DRESSING ON FOREHEAD FOR 3 DAYS, then remove and apply triple antibiotic ointment twice a day     if need to be cleaned, you may use a q-tip and peroxide to do so   There is a metal splint on your nose  - keep it on until follow up appointment and do not get it wet   STOP YOUR ASPIRIN UNTIL YOUR FOLLOW UP APPOINTMENT.  The dissolvable packing placed in your nose will dissolve on its own

## 2022-05-20 NOTE — TRANSFER OF CARE
Anesthesia Transfer of Care Note    Patient: Angeli Ash    Procedure(s) Performed: Procedure(s) (LRB):  CLOSED REDUCTION, FRACTURE, NASAL BONE (N/A)  REMOVAL, SUTURE (N/A)    Patient location: PACU    Anesthesia Type: general    Transport from OR: Transported from OR on room air with adequate spontaneous ventilation    Post pain: adequate analgesia    Post assessment: no apparent anesthetic complications    Post vital signs: stable    Level of consciousness: sedated    Nausea/Vomiting: no nausea/vomiting    Complications: none    Transfer of care protocol was followed      Last vitals:   See pacu documentation

## 2022-05-23 VITALS
RESPIRATION RATE: 20 BRPM | DIASTOLIC BLOOD PRESSURE: 79 MMHG | HEIGHT: 65 IN | OXYGEN SATURATION: 100 % | TEMPERATURE: 98 F | SYSTOLIC BLOOD PRESSURE: 138 MMHG | HEART RATE: 76 BPM | WEIGHT: 234 LBS | BODY MASS INDEX: 38.99 KG/M2

## 2022-05-26 ENCOUNTER — OFFICE VISIT (OUTPATIENT)
Dept: OTOLARYNGOLOGY | Facility: CLINIC | Age: 57
End: 2022-05-26
Payer: COMMERCIAL

## 2022-05-26 VITALS
BODY MASS INDEX: 39.26 KG/M2 | SYSTOLIC BLOOD PRESSURE: 109 MMHG | HEART RATE: 76 BPM | DIASTOLIC BLOOD PRESSURE: 72 MMHG | WEIGHT: 235.69 LBS | TEMPERATURE: 99 F

## 2022-05-26 DIAGNOSIS — J34.89 NASAL OBSTRUCTION: Primary | ICD-10-CM

## 2022-05-26 DIAGNOSIS — Z87.81 HISTORY OF CLOSED FRACTURE OF NASAL BONES: ICD-10-CM

## 2022-05-26 PROCEDURE — 99213 OFFICE O/P EST LOW 20 MIN: CPT | Mod: PBBFAC

## 2022-05-26 RX ORDER — FLUTICASONE PROPIONATE 50 MCG
1 SPRAY, SUSPENSION (ML) NASAL 2 TIMES DAILY
Qty: 15.8 ML | Refills: 0 | Status: SHIPPED | OUTPATIENT
Start: 2022-05-26 | End: 2022-05-27 | Stop reason: SDUPTHER

## 2022-05-26 NOTE — PROGRESS NOTES
[facility]   [facility] OTOLARYNGOLOGY H&P NOTE      REFERRING PHYSICIAN: ED  REASON FOR REFERRAL:  Facial trauma  CHIEF COMPLAINT:   Chief Complaint   Patient presents with    Wound Check     Post op      HISTORY OF PRESENT ILLNESS:   Angeli Ash is a 56 y.o. female with a past medical history significant for hypertension presenting for evaluation of facial trauma.  Patient had a fall while she was putting out her trash can on 05/12/2022.  She did not have any loss of consciousness, syncope or lightheadedness at this time.  Patient was then seen at [facility] at which time she was noted to have a forehead laceration and some nasal bone fractures.  Patient was referred here for further care.  At this time her laceration has been sutured however, she has not been applying any ointment to wait.  There is some crusting around it and usually she has some pain during cleaning her sutures and therefore only performing limited wound care.  She has been prescribed Norco however this makes her nauseous.  She does note she has some nasal obstruction for which she is using nasal saline after which she does have improvement in her symptoms.  Denies all other head and neck related issues    5/26/22: Returns today for post-op follow-up s/p closed reduction of nasal bone fracture and washout/repair of forehead laceration. Doing okay overall, but has not started using nasal rinses or sprays at all. She has been cleaning her forehead laceration as instructed. Still unable to breath through her nose.     REVIEW OF SYSTEMS:   ROS   As above and otherwise negative X 10-point review.     PAST MEDICAL HISTORY:  Past Medical History:   Diagnosis Date    Facial trauma May 12, 2022    Hypertension 2015    Migraine headache 1976    Obesity 2016        PAST SURGICAL HISTORY:  Past Surgical History:   Procedure Laterality Date    CLOSED REDUCTION OF FRACTURE OF NASAL BONE N/A 5/20/2022    Procedure: CLOSED  REDUCTION, FRACTURE, NASAL BONE;  Surgeon: Trev Rosa MD;  Location: [facility] OR;  Service: ENT;  Laterality: N/A;    SUTURE REMOVAL N/A 5/20/2022    Procedure: REMOVAL, SUTURE;  Surgeon: Trev Rosa MD;  Location: [facility] OR;  Service: ENT;  Laterality: N/A;         SOCIAL HISTORY:  Social History     Socioeconomic History    Marital status: Single   Tobacco Use    Smoking status: Never Smoker    Smokeless tobacco: Never Used    Tobacco comment: Family members on both sides smoked.   Substance and Sexual Activity    Alcohol use: Never    Drug use: Never    Sexual activity: Not Currently     Partners: Male     Comment: The Pill         ALLERGIES:  Review of patient's allergies indicates:  No Known Allergies     FAMILY HISTORY:  Family History   Problem Relation Age of Onset    Hypertension Mother     Stroke Maternal Grandmother     Cancer Maternal Aunt     Diabetes Brother     Heart failure Sister         CURRENT MEDICATIONS:  Current Outpatient Medications on File Prior to Visit   Medication Sig Dispense Refill    acetaminophen (TYLENOL) 500 MG tablet Take 2 tablets (1,000 mg total) by mouth every 8 (eight) hours as needed for Pain. 20 tablet 0    amlodipine-valsartan (EXFORGE) 5-320 mg per tablet Take 1 tablet by mouth once daily.      atorvastatin (LIPITOR) 40 MG tablet       cholecalciferol, vitamin D3, (VITAMIN D3) 50 mcg (2,000 unit) Cap capsule       hydroCHLOROthiazide (HYDRODIURIL) 25 MG tablet Take 25 mg by mouth once daily.      metFORMIN (GLUCOPHAGE-XR) 500 MG ER 24hr tablet Take 500 mg by mouth 2 (two) times daily.      sodium chloride (OCEAN NASAL) 0.65 % nasal spray 1 spray by Nasal route 3 (three) times daily. 30 mL 1    spironolactone (ALDACTONE) 50 MG tablet Take 50 mg by mouth 2 (two) times daily.      traMADoL (ULTRAM) 50 mg tablet Take 1 tablet (50 mg total) by mouth every 8 (eight) hours as needed for Pain. 12 tablet 0     Current Facility-Administered  Medications on File Prior to Visit   Medication Dose Route Frequency Provider Last Rate Last Admin    dextrose 10% bolus 125 mL  12.5 g Intravenous PRN Rachele Perez, FNP        dextrose 10% bolus 125 mL  12.5 g Intravenous PRN Rachele Perez, FNP        insulin aspart U-100 injection 2-9 Units  2-9 Units Subcutaneous PRN Rachele Perez, FNP        insulin aspart U-100 injection 4-12 Units  4-12 Units Subcutaneous PRN Rachele Perez, FNP        LIDOcaine (PF) 10 mg/ml (1%) injection 10 mg  1 mL Intradermal Once Rachele Perez, FNP            PHYSICAL EXAMINATION:  Vitals:    05/26/22 1002   BP: 109/72   Pulse: 76   Temp: 98.5 °F (36.9 °C)        General/ Voice: NAD, AAO  Neuro: CN II - XII exam: grossly intact    Head/Face:  Large T-shaped laceration paramedian forehead well reapproximated and sutured with Prolene.  Sutures removed, a few steristrips placed   Eyes: EOMI, PERRLA   Ears: Hearing well at normal conversation volume  Nose: [place] splint in place and removed, no obvious depression of the nasal bones, intranasal exam with edema, small area of edema/possible fluid collection to the anterior septum on the right, 1 ml of lidocaine with 1:100,000 epi injected and area aspirated with 18 gauge needle, no fluid aspirated  Oral Cavity: MMM, no lesions  Neck: no asymmetry, masses, or scars, no adenopathy      LABORATORY RESULTS:    RADIOLOGY Previous   CT Scan:          ASSESSMENT/PLAN:    Angeli Ash is a 56 y.o. female with forehead laceration (sutured), forehead hematoma, and nasal bone fractures with displacement and a step-off on exam resulting in nasal obstruction. Now s/p closed reduction of nasal bones and septum with laceration washout/repair.     -- Sutures removed today, a few steristrips placed- leave in place and keep dry  - Start ocean spray TID, use flonase twice daily  - RTC 1 week      Sarah Narayanan  [facility] Department of Otolaryngology  PGY IV

## 2022-05-27 DIAGNOSIS — Z87.81 HISTORY OF CLOSED FRACTURE OF NASAL BONES: ICD-10-CM

## 2022-05-27 DIAGNOSIS — J34.89 NASAL OBSTRUCTION: ICD-10-CM

## 2022-05-27 RX ORDER — FLUTICASONE PROPIONATE 50 MCG
1 SPRAY, SUSPENSION (ML) NASAL 2 TIMES DAILY
Qty: 15.8 ML | Refills: 0 | Status: SHIPPED | OUTPATIENT
Start: 2022-05-27

## 2022-06-02 ENCOUNTER — OFFICE VISIT (OUTPATIENT)
Dept: OTOLARYNGOLOGY | Facility: CLINIC | Age: 57
End: 2022-06-02
Payer: COMMERCIAL

## 2022-06-02 VITALS
SYSTOLIC BLOOD PRESSURE: 113 MMHG | TEMPERATURE: 98 F | BODY MASS INDEX: 39.65 KG/M2 | DIASTOLIC BLOOD PRESSURE: 73 MMHG | WEIGHT: 238 LBS | HEART RATE: 84 BPM

## 2022-06-02 DIAGNOSIS — S01.81XD FOREHEAD LACERATION, SUBSEQUENT ENCOUNTER: ICD-10-CM

## 2022-06-02 DIAGNOSIS — Z87.81 HISTORY OF CLOSED FRACTURE OF NASAL BONES: Primary | ICD-10-CM

## 2022-06-02 PROCEDURE — 99212 OFFICE O/P EST SF 10 MIN: CPT | Mod: PBBFAC

## 2022-06-02 NOTE — PROGRESS NOTES
[facility]   [facility] OTOLARYNGOLOGY H&P NOTE      REFERRING PHYSICIAN: ED  REASON FOR REFERRAL:  Facial trauma  CHIEF COMPLAINT:   No chief complaint on file.     HISTORY OF PRESENT ILLNESS:   Angeli Ash is a 56 y.o. female with a past medical history significant for hypertension presenting for evaluation of facial trauma.  Patient had a fall while she was putting out her trash can on 05/12/2022.  She did not have any loss of consciousness, syncope or lightheadedness at this time.  Patient was then seen at [facility] at which time she was noted to have a forehead laceration and some nasal bone fractures.  Patient was referred here for further care.  At this time her laceration has been sutured however, she has not been applying any ointment to wait.  There is some crusting around it and usually she has some pain during cleaning her sutures and therefore only performing limited wound care.  She has been prescribed Norco however this makes her nauseous.  She does note she has some nasal obstruction for which she is using nasal saline after which she does have improvement in her symptoms.  Denies all other head and neck related issues    5/26/22: Returns today for post-op follow-up s/p closed reduction of nasal bone fracture and washout/repair of forehead laceration. Doing okay overall, but has not started using nasal rinses or sprays at all. She has been cleaning her forehead laceration as instructed. Still unable to breath through her nose.     6/2/22: Returns for wound check. Using nasal regimen as directed, and is now able to breath well. Doing wound care around steristrips.    REVIEW OF SYSTEMS:   ROS   As above and otherwise negative X 10-point review.     PAST MEDICAL HISTORY:  Past Medical History:   Diagnosis Date    Facial trauma May 12, 2022    Hypertension 2015    Migraine headache 1976    Obesity 2016        PAST SURGICAL HISTORY:  Past Surgical History:   Procedure  Laterality Date    CLOSED REDUCTION OF FRACTURE OF NASAL BONE N/A 5/20/2022    Procedure: CLOSED REDUCTION, FRACTURE, NASAL BONE;  Surgeon: Trev Rosa MD;  Location: [facility] OR;  Service: ENT;  Laterality: N/A;    SUTURE REMOVAL N/A 5/20/2022    Procedure: REMOVAL, SUTURE;  Surgeon: Trev Rosa MD;  Location: [facility] OR;  Service: ENT;  Laterality: N/A;         SOCIAL HISTORY:  Social History     Socioeconomic History    Marital status: Single   Tobacco Use    Smoking status: Never Smoker    Smokeless tobacco: Never Used    Tobacco comment: Family members on both sides smoked.   Substance and Sexual Activity    Alcohol use: Never    Drug use: Never    Sexual activity: Not Currently     Partners: Male     Comment: The Pill         ALLERGIES:  Review of patient's allergies indicates:  No Known Allergies     FAMILY HISTORY:  Family History   Problem Relation Age of Onset    Hypertension Mother     Stroke Maternal Grandmother     Cancer Maternal Aunt     Diabetes Brother     Heart failure Sister         CURRENT MEDICATIONS:  Current Outpatient Medications on File Prior to Visit   Medication Sig Dispense Refill    amlodipine-valsartan (EXFORGE) 5-320 mg per tablet Take 1 tablet by mouth once daily.      atorvastatin (LIPITOR) 40 MG tablet       cholecalciferol, vitamin D3, (VITAMIN D3) 50 mcg (2,000 unit) Cap capsule       fluticasone propionate (FLONASE) 50 mcg/actuation nasal spray 1 spray (50 mcg total) by Each Nostril route 2 (two) times a day. 15.8 mL 0    hydroCHLOROthiazide (HYDRODIURIL) 25 MG tablet Take 25 mg by mouth once daily.      metFORMIN (GLUCOPHAGE-XR) 500 MG ER 24hr tablet Take 500 mg by mouth 2 (two) times daily.      sodium chloride (OCEAN NASAL) 0.65 % nasal spray 1 spray by Nasal route 3 (three) times daily. 30 mL 1    spironolactone (ALDACTONE) 50 MG tablet Take 50 mg by mouth 2 (two) times daily.      traMADoL (ULTRAM) 50 mg tablet Take 1 tablet (50 mg total) by  mouth every 8 (eight) hours as needed for Pain. 12 tablet 0     Current Facility-Administered Medications on File Prior to Visit   Medication Dose Route Frequency Provider Last Rate Last Admin    dextrose 10% bolus 125 mL  12.5 g Intravenous PRN Rachele Perez, FNP        dextrose 10% bolus 125 mL  12.5 g Intravenous PRN Rachele Perez, FNP        insulin aspart U-100 injection 2-9 Units  2-9 Units Subcutaneous PRN Rachele Perez, FNP        insulin aspart U-100 injection 4-12 Units  4-12 Units Subcutaneous PRN Rachele Perez, FNP        LIDOcaine (PF) 10 mg/ml (1%) injection 10 mg  1 mL Intradermal Once Rachele Perez, FNP            PHYSICAL EXAMINATION:  Vitals:    06/02/22 1317   BP: 113/73   Pulse: 84   Temp: 98.3 °F (36.8 °C)        General/ Voice: NAD, AAO  Neuro: CN II - XII exam: grossly intact    Head/Face:  Large T-shaped laceration paramedian forehead well reapproximated with a few steri strips in place,  steristrips removed, wound edges cleaned minor crusting  Eyes: EOMI, PERRLA   Ears: Hearing well at normal conversation volume  Nose: No obvious depression of the nasal bones, intranasal exam improved inferior turbinate hypertrophy, no septal hematoma or fluid collection  Oral Cavity: MMM, no lesions  Neck: no asymmetry, masses, or scars, no adenopathy      RADIOLOGY Previous   CT Scan:          ASSESSMENT/PLAN:    Angeli Ash is a 56 y.o. female with forehead laceration (sutured), forehead hematoma, and nasal bone fractures with displacement and a step-off on exam resulting in nasal obstruction. Now s/p closed reduction of nasal bones and septum with laceration washout/repair.       - Continue ocean spray TID, use flonase twice daily  - Wound care instructions given  - RTC 4 -5  Weeks for final check      Sarah Narayanan  [facility] Department of Otolaryngology  PGY IV

## 2022-06-16 ENCOUNTER — OFFICE VISIT (OUTPATIENT)
Dept: OTOLARYNGOLOGY | Facility: CLINIC | Age: 57
End: 2022-06-16
Payer: COMMERCIAL

## 2022-06-16 VITALS
HEIGHT: 65 IN | DIASTOLIC BLOOD PRESSURE: 68 MMHG | TEMPERATURE: 98 F | BODY MASS INDEX: 39.79 KG/M2 | SYSTOLIC BLOOD PRESSURE: 105 MMHG | HEART RATE: 84 BPM | WEIGHT: 238.81 LBS

## 2022-06-16 DIAGNOSIS — S01.81XD FOREHEAD LACERATION, SUBSEQUENT ENCOUNTER: Primary | ICD-10-CM

## 2022-06-16 PROCEDURE — 99213 OFFICE O/P EST LOW 20 MIN: CPT | Mod: PBBFAC

## 2022-06-16 RX ORDER — NAPROXEN SODIUM 220 MG/1
81 TABLET, FILM COATED ORAL DAILY
COMMUNITY

## 2022-06-16 NOTE — PROGRESS NOTES
"[facility] OTOLARYNGOLOGY H&P NOTE      REFERRING PHYSICIAN: ED  REASON FOR REFERRAL:  Facial trauma  CHIEF COMPLAINT:   No chief complaint on file.     HISTORY OF PRESENT ILLNESS:   Angeli Ash is a 56 y.o. female with a past medical history significant for hypertension presenting for evaluation of facial trauma.  Patient had a fall while she was putting out her trash can on 05/12/2022.  She did not have any loss of consciousness, syncope or lightheadedness at this time.  Patient was then seen at [facility] at which time she was noted to have a forehead laceration and some nasal bone fractures.  Patient was referred here for further care.  At this time her laceration has been sutured however, she has not been applying any ointment to wait.  There is some crusting around it and usually she has some pain during cleaning her sutures and therefore only performing limited wound care.  She has been prescribed Norco however this makes her nauseous.  She does note she has some nasal obstruction for which she is using nasal saline after which she does have improvement in her symptoms.  Denies all other head and neck related issues    5/26/22: Returns today for post-op follow-up s/p closed reduction of nasal bone fracture and washout/repair of forehead laceration. Doing okay overall, but has not started using nasal rinses or sprays at all. She has been cleaning her forehead laceration as instructed. Still unable to breath through her nose.     6/2/22: Returns for wound check. Using nasal regimen as directed, and is now able to breath well. Doing wound care around steristrips.    6/16/22: Here today after calling clinic concerned about drainage from her forehead wound. Described bloody drainage, felt a "thumping" when she palpated the area. Was also worried about possible infection. No fevers, non-tender.     REVIEW OF SYSTEMS:   ROS   As above and otherwise negative X 10-point " review.     PAST MEDICAL HISTORY:  Past Medical History:   Diagnosis Date    Facial trauma May 12, 2022    Hypertension 2015    Migraine headache 1976    Obesity 2016        PAST SURGICAL HISTORY:  Past Surgical History:   Procedure Laterality Date    CLOSED REDUCTION OF FRACTURE OF NASAL BONE N/A 5/20/2022    Procedure: CLOSED REDUCTION, FRACTURE, NASAL BONE;  Surgeon: Trev Rosa MD;  Location: [facility];  Service: ENT;  Laterality: N/A;    SUTURE REMOVAL N/A 5/20/2022    Procedure: REMOVAL, SUTURE;  Surgeon: Trev Rosa MD;  Location: [facility];  Service: ENT;  Laterality: N/A;         SOCIAL HISTORY:  Social History     Socioeconomic History    Marital status: Single   Tobacco Use    Smoking status: Never Smoker    Smokeless tobacco: Never Used    Tobacco comment: Family members on both sides smoked.   Substance and Sexual Activity    Alcohol use: Never    Drug use: Never    Sexual activity: Not Currently     Partners: Male     Comment: The Pill         ALLERGIES:  Review of patient's allergies indicates:  No Known Allergies     FAMILY HISTORY:  Family History   Problem Relation Age of Onset    Hypertension Mother     Stroke Maternal Grandmother     Cancer Maternal Aunt     Diabetes Brother     Heart failure Sister         CURRENT MEDICATIONS:  Current Outpatient Medications on File Prior to Visit   Medication Sig Dispense Refill    amlodipine-valsartan (EXFORGE) 5-320 mg per tablet Take 1 tablet by mouth once daily.      aspirin 81 MG Chew Take 81 mg by mouth once daily.      atorvastatin (LIPITOR) 40 MG tablet       cholecalciferol, vitamin D3, (VITAMIN D3) 50 mcg (2,000 unit) Cap capsule       fluticasone propionate (FLONASE) 50 mcg/actuation nasal spray 1 spray (50 mcg total) by Each Nostril route 2 (two) times a day. 15.8 mL 0    hydroCHLOROthiazide (HYDRODIURIL) 25 MG tablet Take 25 mg by mouth once daily.      metFORMIN (GLUCOPHAGE-XR) 500 MG ER 24hr tablet Take 500 mg  by mouth 2 (two) times daily.      sodium chloride (OCEAN NASAL) 0.65 % nasal spray 1 spray by Nasal route 3 (three) times daily. 30 mL 1    spironolactone (ALDACTONE) 50 MG tablet Take 50 mg by mouth 2 (two) times daily.      traMADoL (ULTRAM) 50 mg tablet Take 1 tablet (50 mg total) by mouth every 8 (eight) hours as needed for Pain. 12 tablet 0     Current Facility-Administered Medications on File Prior to Visit   Medication Dose Route Frequency Provider Last Rate Last Admin    dextrose 10% bolus 125 mL  12.5 g Intravenous PRN Rachele S Nieves-Niecy, FNP        dextrose 10% bolus 125 mL  12.5 g Intravenous PRN Rachele S Nieves-Niecy, FNP        insulin aspart U-100 injection 2-9 Units  2-9 Units Subcutaneous PRN Rachele S Nieves-Niecy, FNP        insulin aspart U-100 injection 4-12 Units  4-12 Units Subcutaneous PRN Rachele S Nieves-Niecy, FNP        LIDOcaine (PF) 10 mg/ml (1%) injection 10 mg  1 mL Intradermal Once Rachele S Nieves-Niecy, FNP            PHYSICAL EXAMINATION:  Vitals:    06/16/22 1514   BP: 105/68   Pulse: 84   Temp: 97.9 °F (36.6 °C)        General/ Voice: NAD, AAO  Neuro: CN II - XII exam: grossly intact    Head/Face:  Large T-shaped laceration paramedian forehead well reapproximated except small area pinpoint bleeding, old/venous blood expressed with palpation of wound, no active bleeding, pressure dressing applied to forehead  Eyes: EOMI, PERRLA   Ears: Hearing well at normal conversation volume  Nose: No obvious depression of the nasal bones, intranasal exam improved inferior turbinate hypertrophy, no septal hematoma or fluid collection  Oral Cavity: MMM, no lesions  Neck: no asymmetry, masses, or scars, no adenopathy      RADIOLOGY Previous   CT Scan:          ASSESSMENT/PLAN:    Angeli Ash is a 56 y.o. female with forehead laceration (sutured), forehead hematoma, and nasal bone fractures with displacement and a step-off on exam resulting in nasal obstruction. Now s/p closed  reduction of nasal bones and septum with laceration washout/repair. Small area of dehiscence with old venous blood/hematoma      - Continue ocean spray TID, use flonase twice daily  - Wound care instructions given, keep pressure dressing in place 48 hours  - RTC previous scheduled appointment for final wound check      Sarah Narayanan  [facility] Department of Otolaryngology  PGY IV

## 2022-07-20 ENCOUNTER — OFFICE VISIT (OUTPATIENT)
Dept: OTOLARYNGOLOGY | Facility: CLINIC | Age: 57
End: 2022-07-20
Payer: COMMERCIAL

## 2022-07-20 VITALS
HEIGHT: 65 IN | DIASTOLIC BLOOD PRESSURE: 77 MMHG | WEIGHT: 239 LBS | SYSTOLIC BLOOD PRESSURE: 115 MMHG | HEART RATE: 70 BPM | BODY MASS INDEX: 39.82 KG/M2 | TEMPERATURE: 98 F

## 2022-07-20 DIAGNOSIS — Z87.81 HISTORY OF CLOSED FRACTURE OF NASAL BONES: ICD-10-CM

## 2022-07-20 DIAGNOSIS — S01.81XD FOREHEAD LACERATION, SUBSEQUENT ENCOUNTER: Primary | ICD-10-CM

## 2022-07-20 PROCEDURE — 99213 OFFICE O/P EST LOW 20 MIN: CPT | Mod: PBBFAC | Performed by: OTOLARYNGOLOGY

## 2022-07-20 NOTE — PROGRESS NOTES
"[facility] and [facility] OTOLARYNGOLOGY H&P NOTE      REFERRING PHYSICIAN: ED  REASON FOR REFERRAL:  Facial trauma  CHIEF COMPLAINT:   Chief Complaint   Patient presents with    Wound Check      HISTORY OF PRESENT ILLNESS:   Angeli Ash is a 56 y.o. female with a past medical history significant for hypertension presenting for evaluation of facial trauma.  Patient had a fall while she was putting out her trash can on 05/12/2022.  She did not have any loss of consciousness, syncope or lightheadedness at this time.  Patient was then seen at [facility] at which time she was noted to have a forehead laceration and some nasal bone fractures.  Patient was referred here for further care.  At this time her laceration has been sutured however, she has not been applying any ointment to wait.  There is some crusting around it and usually she has some pain during cleaning her sutures and therefore only performing limited wound care.  She has been prescribed Norco however this makes her nauseous.  She does note she has some nasal obstruction for which she is using nasal saline after which she does have improvement in her symptoms.  Denies all other head and neck related issues    5/26/22: Returns today for post-op follow-up s/p closed reduction of nasal bone fracture and washout/repair of forehead laceration. Doing okay overall, but has not started using nasal rinses or sprays at all. She has been cleaning her forehead laceration as instructed. Still unable to breath through her nose.     6/2/22: Returns for wound check. Using nasal regimen as directed, and is now able to breath well. Doing wound care around steristrips.    6/16/22: Here today after calling clinic concerned about drainage from her forehead wound. Described bloody drainage, felt a "thumping" when she palpated the area. Was also worried about possible infection. No fevers, non-tender.     7/20/22: Here for follow up. No issues, no " infections. Using nasal sprays. Denies nasal obstruction.     REVIEW OF SYSTEMS:   ROS   As above and otherwise negative X 10-point review.     PAST MEDICAL HISTORY:  Past Medical History:   Diagnosis Date    Facial trauma May 12, 2022    Hypertension 2015    Migraine headache 1976    Obesity 2016        PAST SURGICAL HISTORY:  Past Surgical History:   Procedure Laterality Date    CLOSED REDUCTION OF FRACTURE OF NASAL BONE N/A 5/20/2022    Procedure: CLOSED REDUCTION, FRACTURE, NASAL BONE;  Surgeon: Trev Rosa MD;  Location: [facility] OR;  Service: ENT;  Laterality: N/A;    SUTURE REMOVAL N/A 5/20/2022    Procedure: REMOVAL, SUTURE;  Surgeon: Trev Rosa MD;  Location: [facility] OR;  Service: ENT;  Laterality: N/A;         SOCIAL HISTORY:  Social History     Socioeconomic History    Marital status: Single   Tobacco Use    Smoking status: Never Smoker    Smokeless tobacco: Never Used    Tobacco comment: Family members on both sides smoked.   Substance and Sexual Activity    Alcohol use: Never    Drug use: Never    Sexual activity: Not Currently     Partners: Male     Comment: The Pill         ALLERGIES:  Review of patient's allergies indicates:  No Known Allergies     FAMILY HISTORY:  Family History   Problem Relation Age of Onset    Hypertension Mother     Stroke Maternal Grandmother     Cancer Maternal Aunt     Diabetes Brother     Heart failure Sister         CURRENT MEDICATIONS:  Current Outpatient Medications on File Prior to Visit   Medication Sig Dispense Refill    amlodipine-valsartan (EXFORGE) 5-320 mg per tablet Take 1 tablet by mouth once daily.      aspirin 81 MG Chew Take 81 mg by mouth once daily.      atorvastatin (LIPITOR) 40 MG tablet       cholecalciferol, vitamin D3, (VITAMIN D3) 50 mcg (2,000 unit) Cap capsule       fluticasone propionate (FLONASE) 50 mcg/actuation nasal spray 1 spray (50 mcg total) by Each Nostril route 2 (two) times a day. 15.8 mL 0     hydroCHLOROthiazide (HYDRODIURIL) 25 MG tablet Take 25 mg by mouth once daily.      metFORMIN (GLUCOPHAGE-XR) 500 MG ER 24hr tablet Take 500 mg by mouth 2 (two) times daily.      sodium chloride (OCEAN NASAL) 0.65 % nasal spray 1 spray by Nasal route 3 (three) times daily. 30 mL 1    spironolactone (ALDACTONE) 50 MG tablet Take 50 mg by mouth 2 (two) times daily.      traMADoL (ULTRAM) 50 mg tablet Take 1 tablet (50 mg total) by mouth every 8 (eight) hours as needed for Pain. 12 tablet 0     Current Facility-Administered Medications on File Prior to Visit   Medication Dose Route Frequency Provider Last Rate Last Admin    dextrose 10% bolus 125 mL  12.5 g Intravenous PRN Rachele S Nieves-Niecy, FNP        dextrose 10% bolus 125 mL  12.5 g Intravenous PRN Rachele S Nieves-Niecy, FNP        insulin aspart U-100 injection 2-9 Units  2-9 Units Subcutaneous PRN Rachele S Nieves-Niecy, FNP        insulin aspart U-100 injection 4-12 Units  4-12 Units Subcutaneous PRN Rachele S Nieves-Niecy, FNP        LIDOcaine (PF) 10 mg/ml (1%) injection 10 mg  1 mL Intradermal Once Rachele S Nieves-Niecy, FNP            PHYSICAL EXAMINATION:  Vitals:    07/20/22 1123   BP: 115/77   Pulse: 70   Temp: 98.4 °F (36.9 °C)        General/ Voice: NAD, AAO  Neuro: CN II - XII exam: grossly intact    Head/Face:  Large T-shaped laceration paramedian forehead well reapproximated, healing fine  Eyes: EOMI, PERRLA   Ears: Hearing well at normal conversation volume  Nose: No obvious depression of the nasal bones, intranasal exam improved inferior turbinate hypertrophy, no septal hematoma or fluid collection  Oral Cavity: MMM, no lesions  Neck: no asymmetry, masses, or scars, no adenopathy      RADIOLOGY Previous   CT Scan:          ASSESSMENT/PLAN:    Angeli Ash is a 56 y.o. female with forehead laceration (sutured), forehead hematoma, and nasal bone fractures with displacement and a step-off on exam resulting in nasal obstruction. Now s/p  closed reduction of nasal bones and septum with laceration washout/repair.     - Continue ocean spray TID, use flonase twice daily  - Wound care instructions given  - RTC PRN      Deisy Cristobal  [facility] Department of Otolaryngology  PGY IV

## (undated) DEVICE — SOL POVIDONE PREP IODINE 4 OZ

## (undated) DEVICE — DRESSING TELFA STRL 4X3 LF

## (undated) DEVICE — SYR 10CC LUER LOCK

## (undated) DEVICE — GLOVE PROTEXIS LTX MICRO 6.5

## (undated) DEVICE — SUT 4-0 PROLENE 18 P-3

## (undated) DEVICE — SEE L#159833

## (undated) DEVICE — CLOSURE SKIN STERI STRIP 1/2X4

## (undated) DEVICE — SPONGE LAP STRL 18X18IN

## (undated) DEVICE — PENCIL ELECSURG ROCKER 15FT

## (undated) DEVICE — DRESSING NASOPORE FD 8CM

## (undated) DEVICE — SEE MEDLINE ITEM 157117

## (undated) DEVICE — SEE MEDLINE ITEM 152529

## (undated) DEVICE — ADHESIVE MASTISOL VIAL 48/BX

## (undated) DEVICE — GAUZE SPONGE 4X4 12PLY

## (undated) DEVICE — TUBING MEDI-VAC 20FT .25IN

## (undated) DEVICE — MANIFOLD 4 PORT

## (undated) DEVICE — CORD BIPOLAR 12 FOOT

## (undated) DEVICE — SUT 3-0 VICRYL / SH (J416)

## (undated) DEVICE — SPONGE DERMACEA GAUZE 4X4

## (undated) DEVICE — SYR IRRIGATION BULB STER 60ML

## (undated) DEVICE — TOWEL OR DISP STRL BLUE 4/PK

## (undated) DEVICE — MARKER WRITESITE SKIN CHLRAPRP

## (undated) DEVICE — Device